# Patient Record
Sex: MALE | Race: ASIAN | NOT HISPANIC OR LATINO
[De-identification: names, ages, dates, MRNs, and addresses within clinical notes are randomized per-mention and may not be internally consistent; named-entity substitution may affect disease eponyms.]

---

## 2020-10-13 ENCOUNTER — APPOINTMENT (OUTPATIENT)
Dept: CARDIOLOGY | Facility: CLINIC | Age: 35
End: 2020-10-13
Payer: MEDICAID

## 2020-10-13 VITALS
BODY MASS INDEX: 29.78 KG/M2 | TEMPERATURE: 97.9 F | RESPIRATION RATE: 17 BRPM | DIASTOLIC BLOOD PRESSURE: 97 MMHG | HEIGHT: 67.5 IN | HEART RATE: 84 BPM | SYSTOLIC BLOOD PRESSURE: 160 MMHG | OXYGEN SATURATION: 95 % | WEIGHT: 192 LBS

## 2020-10-13 DIAGNOSIS — Z87.442 PERSONAL HISTORY OF URINARY CALCULI: ICD-10-CM

## 2020-10-13 DIAGNOSIS — F17.210 NICOTINE DEPENDENCE, CIGARETTES, UNCOMPLICATED: ICD-10-CM

## 2020-10-13 DIAGNOSIS — I10 ESSENTIAL (PRIMARY) HYPERTENSION: ICD-10-CM

## 2020-10-13 DIAGNOSIS — E78.5 HYPERLIPIDEMIA, UNSPECIFIED: ICD-10-CM

## 2020-10-13 DIAGNOSIS — R55 SYNCOPE AND COLLAPSE: ICD-10-CM

## 2020-10-13 DIAGNOSIS — R73.9 HYPERGLYCEMIA, UNSPECIFIED: ICD-10-CM

## 2020-10-13 DIAGNOSIS — R00.2 PALPITATIONS: ICD-10-CM

## 2020-10-13 PROBLEM — Z00.00 ENCOUNTER FOR PREVENTIVE HEALTH EXAMINATION: Status: ACTIVE | Noted: 2020-10-13

## 2020-10-13 PROCEDURE — 99204 OFFICE O/P NEW MOD 45 MIN: CPT | Mod: 25

## 2020-10-13 PROCEDURE — 93015 CV STRESS TEST SUPVJ I&R: CPT

## 2020-10-13 PROCEDURE — 93306 TTE W/DOPPLER COMPLETE: CPT

## 2020-10-14 PROBLEM — F17.210 CIGARETTE SMOKER: Status: ACTIVE | Noted: 2020-10-14

## 2020-10-14 PROBLEM — R73.9 HYPERGLYCEMIA: Status: ACTIVE | Noted: 2020-10-14

## 2020-10-14 PROBLEM — Z87.442 HISTORY OF KIDNEY STONES: Status: RESOLVED | Noted: 2020-10-14 | Resolved: 2020-10-14

## 2020-10-14 PROBLEM — E78.5 HYPERLIPIDEMIA: Status: ACTIVE | Noted: 2020-10-14

## 2020-10-14 RX ORDER — FENOFIBRATE 134 MG/1
134 CAPSULE ORAL
Refills: 0 | Status: ACTIVE | COMMUNITY

## 2020-10-14 RX ORDER — AMLODIPINE BESYLATE 10 MG/1
10 TABLET ORAL
Refills: 0 | Status: ACTIVE | COMMUNITY

## 2020-10-14 RX ORDER — LOSARTAN POTASSIUM 50 MG/1
50 TABLET, FILM COATED ORAL
Refills: 0 | Status: ACTIVE | COMMUNITY

## 2020-10-14 RX ORDER — ATORVASTATIN CALCIUM 20 MG/1
20 TABLET, FILM COATED ORAL
Refills: 0 | Status: ACTIVE | COMMUNITY

## 2020-10-16 NOTE — PHYSICAL EXAM
[General Appearance - Well Developed] : well developed [General Appearance - Well Nourished] : well nourished [Well Groomed] : well groomed [Normal Appearance] : normal appearance [General Appearance - In No Acute Distress] : no acute distress [No Deformities] : no deformities [Normal Conjunctiva] : the conjunctiva exhibited no abnormalities [No Oral Pallor] : no oral pallor [Eyelids - No Xanthelasma] : the eyelids demonstrated no xanthelasmas [Normal Oral Mucosa] : normal oral mucosa [No Oral Cyanosis] : no oral cyanosis [Normal Jugular Venous A Waves Present] : normal jugular venous A waves present [Normal Jugular Venous V Waves Present] : normal jugular venous V waves present [No Jugular Venous Ron A Waves] : no jugular venous ron A waves [Heart Rate And Rhythm] : heart rate and rhythm were normal [Murmurs] : no murmurs present [Heart Sounds] : normal S1 and S2 [Exaggerated Use Of Accessory Muscles For Inspiration] : no accessory muscle use [Respiration, Rhythm And Depth] : normal respiratory rhythm and effort [Abdomen Soft] : soft [Abdomen Tenderness] : non-tender [Auscultation Breath Sounds / Voice Sounds] : lungs were clear to auscultation bilaterally [Abdomen Mass (___ Cm)] : no abdominal mass palpated [Gait - Sufficient For Exercise Testing] : the gait was sufficient for exercise testing [Nail Clubbing] : no clubbing of the fingernails [Abnormal Walk] : normal gait [Petechial Hemorrhages (___cm)] : no petechial hemorrhages [Cyanosis, Localized] : no localized cyanosis [Oriented To Time, Place, And Person] : oriented to person, place, and time [] : no ischemic changes [Mood] : the mood was normal [Affect] : the affect was normal [No Anxiety] : not feeling anxious

## 2020-10-20 PROBLEM — R00.2 PALPITATIONS: Status: ACTIVE | Noted: 2020-10-13

## 2020-10-20 PROBLEM — R55 SYNCOPE: Status: ACTIVE | Noted: 2020-10-13

## 2020-10-20 PROBLEM — I10 HYPERTENSION: Status: ACTIVE | Noted: 2020-10-14

## 2020-10-23 ENCOUNTER — NON-APPOINTMENT (OUTPATIENT)
Age: 35
End: 2020-10-23

## 2020-10-26 NOTE — DISCUSSION/SUMMARY
[FreeTextEntry1] : 35 year old male with HTN, HLD, hyperglycemia, kidney stone, and left ocular prosthesis presents for evaluation of syncope.  Patient reports that in September when he was having dinner with friends and had 2 beers which was not much for him, he started to have palpitations, which lasted about 10 minutes. He felt unstable gait, and diaphoretic. He felt better after he sat down, but collapsed when he stood up. He passed out for about a few seconds.  Patient was conscious when he woke up and did not lose urine or stool control.  He has had 3 episodes of palpitations. Once when he was in high school, and 2 times in September.  Patient denies CP/SOB at baseline.  He is in process of applying to [employer].  The status is still pending due to health examination.  Patient reports having recent knee injury after excessive running for training.  He is on Amlodipine 10 mg and Losartan 50 mg for HTN.  I advised patient to undergo an echocardiogram and a treadmill stress test. I advised patient to wear a Holter monitor. \par \par (1) Syncope, palpitations - Patient was not orthostatic (->135->130).  Patient underwent an echocardiogram and it showed normal LV function without significant valvular pathology. Patient underwent a treadmill stress test and completed 9.5 minutes of Eddy protocol.  There were upsloping ST depressions on ECG but no symptoms.  Following treadmill stress, there was no echocardiographic evidence of ischemia.  However, patient developed NSVT at peak exercise.  Patient wore a Holter and it showed rare APC's, episodes of second degree AV block at night.  I advised patient to followup with EP.\par \par (2) HTN - His BP was elevated initially but was acceptable on repeat measurement.  I advised patient to continue Amlodipine 10 mg and Losartan 50 mg.\par \par (3) Followup - pending EP evaluation.

## 2020-10-26 NOTE — HISTORY OF PRESENT ILLNESS
[FreeTextEntry1] : 35 year old male with HTN, HLD, hyperglycemia, kidney stone, and left ocular prosthesis presents for evaluation of syncope.  Patient reports that in September when he was having dinner with friends and had 2 beers which was not much for him, he started to have palpitations, which lasted about 10 minutes. He felt unstable gait, and diaphoretic. He felt better after he sat down, but collapsed when he stood up. He passed out for about a few seconds.  Patient was conscious when he woke up and did not lose urine or stool control.  He has had 3 episodes of palpitations. Once when he was in high school, and 2 times in September.  Patient denies CP/SOB at baseline.  He is in process of applying to [employer].  The status is still pending due to health examination.  Patient reports having recent knee injury after excessive running for training.  He is on Amlodipine 10 mg and Losartan 50 mg for HTN.  I advised patient to undergo an echocardiogram and a treadmill stress test. I advised patient to wear a Holter monitor.

## 2021-07-12 ENCOUNTER — EMERGENCY (EMERGENCY)
Facility: HOSPITAL | Age: 36
LOS: 1 days | Discharge: HOME | End: 2021-07-12
Attending: CLINIC/CENTER
Payer: MEDICAID

## 2021-07-12 VITALS
TEMPERATURE: 99 F | HEART RATE: 94 BPM | RESPIRATION RATE: 18 BRPM | HEIGHT: 69 IN | SYSTOLIC BLOOD PRESSURE: 123 MMHG | OXYGEN SATURATION: 96 % | DIASTOLIC BLOOD PRESSURE: 84 MMHG | WEIGHT: 199.96 LBS

## 2021-07-12 PROCEDURE — 99285 EMERGENCY DEPT VISIT HI MDM: CPT | Mod: 25

## 2021-07-12 PROCEDURE — 10061 I&D ABSCESS COMP/MULTIPLE: CPT

## 2021-07-12 NOTE — ED ADULT TRIAGE NOTE - CHIEF COMPLAINT QUOTE
L groin pain x several months, worsened yesterday. Hematuria last week. Denies trauma/injury. Denies penile discharge.

## 2021-07-13 VITALS
HEART RATE: 75 BPM | RESPIRATION RATE: 20 BRPM | SYSTOLIC BLOOD PRESSURE: 137 MMHG | TEMPERATURE: 98 F | DIASTOLIC BLOOD PRESSURE: 76 MMHG | OXYGEN SATURATION: 97 %

## 2021-07-13 LAB
ANION GAP SERPL CALC-SCNC: 14 MMOL/L — SIGNIFICANT CHANGE UP (ref 5–17)
BASOPHILS # BLD AUTO: 0.05 K/UL — SIGNIFICANT CHANGE UP (ref 0–0.2)
BASOPHILS NFR BLD AUTO: 0.5 % — SIGNIFICANT CHANGE UP (ref 0–2)
BUN SERPL-MCNC: 14 MG/DL — SIGNIFICANT CHANGE UP (ref 7–23)
CALCIUM SERPL-MCNC: 9.2 MG/DL — SIGNIFICANT CHANGE UP (ref 8.4–10.5)
CHLORIDE SERPL-SCNC: 103 MMOL/L — SIGNIFICANT CHANGE UP (ref 96–108)
CO2 SERPL-SCNC: 22 MMOL/L — SIGNIFICANT CHANGE UP (ref 22–31)
CREAT SERPL-MCNC: 0.87 MG/DL — SIGNIFICANT CHANGE UP (ref 0.5–1.3)
EOSINOPHIL # BLD AUTO: 0.22 K/UL — SIGNIFICANT CHANGE UP (ref 0–0.5)
EOSINOPHIL NFR BLD AUTO: 2.2 % — SIGNIFICANT CHANGE UP (ref 0–6)
GLUCOSE SERPL-MCNC: 104 MG/DL — HIGH (ref 70–99)
HCT VFR BLD CALC: 44.9 % — SIGNIFICANT CHANGE UP (ref 39–50)
HGB BLD-MCNC: 14.2 G/DL — SIGNIFICANT CHANGE UP (ref 13–17)
IMM GRANULOCYTES NFR BLD AUTO: 0.3 % — SIGNIFICANT CHANGE UP (ref 0–1.5)
LYMPHOCYTES # BLD AUTO: 3.66 K/UL — HIGH (ref 1–3.3)
LYMPHOCYTES # BLD AUTO: 36 % — SIGNIFICANT CHANGE UP (ref 13–44)
MCHC RBC-ENTMCNC: 28.1 PG — SIGNIFICANT CHANGE UP (ref 27–34)
MCHC RBC-ENTMCNC: 31.6 GM/DL — LOW (ref 32–36)
MCV RBC AUTO: 88.7 FL — SIGNIFICANT CHANGE UP (ref 80–100)
MONOCYTES # BLD AUTO: 0.7 K/UL — SIGNIFICANT CHANGE UP (ref 0–0.9)
MONOCYTES NFR BLD AUTO: 6.9 % — SIGNIFICANT CHANGE UP (ref 2–14)
NEUTROPHILS # BLD AUTO: 5.52 K/UL — SIGNIFICANT CHANGE UP (ref 1.8–7.4)
NEUTROPHILS NFR BLD AUTO: 54.1 % — SIGNIFICANT CHANGE UP (ref 43–77)
NRBC # BLD: 0 /100 WBCS — SIGNIFICANT CHANGE UP (ref 0–0)
PLATELET # BLD AUTO: 278 K/UL — SIGNIFICANT CHANGE UP (ref 150–400)
POTASSIUM SERPL-MCNC: 3.6 MMOL/L — SIGNIFICANT CHANGE UP (ref 3.5–5.3)
POTASSIUM SERPL-SCNC: 3.6 MMOL/L — SIGNIFICANT CHANGE UP (ref 3.5–5.3)
RBC # BLD: 5.06 M/UL — SIGNIFICANT CHANGE UP (ref 4.2–5.8)
RBC # FLD: 12.3 % — SIGNIFICANT CHANGE UP (ref 10.3–14.5)
SODIUM SERPL-SCNC: 139 MMOL/L — SIGNIFICANT CHANGE UP (ref 135–145)
WBC # BLD: 10.18 K/UL — SIGNIFICANT CHANGE UP (ref 3.8–10.5)
WBC # FLD AUTO: 10.18 K/UL — SIGNIFICANT CHANGE UP (ref 3.8–10.5)

## 2021-07-13 PROCEDURE — 10060 I&D ABSCESS SIMPLE/SINGLE: CPT

## 2021-07-13 PROCEDURE — 85025 COMPLETE CBC W/AUTO DIFF WBC: CPT

## 2021-07-13 PROCEDURE — 80048 BASIC METABOLIC PNL TOTAL CA: CPT

## 2021-07-13 PROCEDURE — 99283 EMERGENCY DEPT VISIT LOW MDM: CPT | Mod: 25

## 2021-07-13 RX ORDER — ACETAMINOPHEN 500 MG
975 TABLET ORAL ONCE
Refills: 0 | Status: COMPLETED | OUTPATIENT
Start: 2021-07-13 | End: 2021-07-13

## 2021-07-13 RX ADMIN — Medication 975 MILLIGRAM(S): at 05:24

## 2021-07-13 NOTE — ED PROCEDURE NOTE - CPROC ED POST PROC CARE GUIDE1
Verbal/written post procedure instructions were given to patient/caregiver.
Verbal/written post procedure instructions were given to patient/caregiver./Instructed patient/caregiver to follow-up with primary care physician./Instructed patient/caregiver regarding signs and symptoms of infection.

## 2021-07-13 NOTE — ED PROVIDER NOTE - PATIENT PORTAL LINK FT
You can access the FollowMyHealth Patient Portal offered by [facility] by registering at the following website: [email or web address]. By joining [employer] [facility]’s FollowMyHealth portal, you will also be able to view your health information using other applications (apps) compatible with our system.

## 2021-07-13 NOTE — ED PROVIDER NOTE - NSFOLLOWUPINSTRUCTIONS_ED_ALL_ED_FT
YOU WERE SEEN IN THE ED FOR: GROIN FOLLICULITIS WITH ABSCESS     YOU HAD INCISION AND DRAINAGE OF THE ABSCESS. YOU CAN SHOWER, BUT PLEASE KEEP THE AREA DRY AND CLEAN.  FOR PAIN/FEVER, YOU MAY TAKE TYLENOL (acetaminophen) AND/OR IBUPROFEN (advil or motrin). FOLLOW THE INSTRUCTIONS ON THE LABEL/CONTAINER.    PLEASE FOLLOW UP WITH YOUR PRIVATE PHYSICIAN WITHIN THE NEXT 72 HOURS. BRING COPIES OF YOUR RESULTS.  -If you do not have a PMD, please call [phone] to find one convenient for you or call our clinic at [phone].    RETURN TO THE EMERGENCY DEPARTMENT IF YOU EXPERIENCE ANY NEW/CONCERNING/WORSENING SYMPTOMS SUCH AS BUT NOT LIMITED TO:  FEVERS, WORSENING DRAINAGE AND/OR PAIN, INCREASING AREA OF REDNESS

## 2021-07-13 NOTE — ED ADULT NURSE NOTE - OBJECTIVE STATEMENT
36 year old male presents to ED via walk-in complaining of groin pain x1 day. Pt with known kidney stone and HTN. Upon assessment A&O x4, ambulatory and well appearing. Pain 4/10, has not taken anything for pain. Quarter sized redness area noted to left side of groin. Denies problems urinating. Endorses blood in urine "on and off" due to kidney stone. bed locked and lowered. VS as documented. Comfort and safety measures maintained.

## 2021-07-13 NOTE — ED PROVIDER NOTE - CLINICAL SUMMARY MEDICAL DECISION MAKING FREE TEXT BOX
36M w/ hx of HTN, presenting with L groin pain x1 day, found to have ~2cm tender erythematous induration in left inguinal area above genitals. Suspect folliculitis. POCUS to r/o abscess. Labs, possible I&D, pain control, reassess. Dispo likely home.

## 2021-07-13 NOTE — ED PROCEDURE NOTE - ATTENDING CONTRIBUTION TO CARE
Attending Statement: I have personally seen and examined this patient.  I have fully participated in the care of this patient. I have reviewed all pertinent clinical information, including history, physical exam, plan and the resident/fellow/apc’s note and agree except as noted.

## 2021-07-13 NOTE — ED PROVIDER NOTE - ATTENDING CONTRIBUTION TO CARE
Attending Statement: I have personally seen and examined this patient.  I have fully participated in the care of this patient. I have reviewed all pertinent clinical information, including history, physical exam, plan and the resident/fellow/apc’s note and agree except as noted.     h.o HTN, p.w acute onset of left groin pain w. focal pain nodule. patient has similar nodule in the past but never tender, reports shaving groin area. PE concerning for abscess vs folliculitis. TTP, 2cm, will POCUS and i&D prn. will symptomatic/pain control at this time. no fever, vital signs wnl, will check FS for DM. no sign of abhinav's or sepsis or testicular torsion or infection.

## 2021-07-13 NOTE — ED PROVIDER NOTE - PHYSICAL EXAMINATION
GENERAL: NAD, well-developed  HEAD:  Atraumatic, Normocephalic  EYES: EOMI, PERRL, sclera clear  NECK: Supple, No JVD  CHEST/LUNG: Clear to auscultation bilaterally; No wheezes or rales  HEART: Regular rate and rhythm; No murmurs, rubs, or gallops  ABDOMEN: Soft, Nontender, Nondistended; Bowel sounds present  : (chaperoned by ED juan Trivedi) ~2cm induration, erythema, TTP on L upper inguinal region above genitals  EXTREMITIES:  2+ Peripheral Pulses, No clubbing, cyanosis, or edema  PSYCH: AAOx3  NEUROLOGY: non-focal  SKIN: No rashes or lesions GENERAL: NAD, well-developed  HEAD:  Atraumatic, Normocephalic  EYES: EOMI, PERRL, sclera clear  NECK: Supple, No JVD  CHEST/LUNG: Clear to auscultation bilaterally; No wheezes or rales  HEART: Regular rate and rhythm; No murmurs, rubs, or gallops  ABDOMEN: Soft, Nontender, Nondistended; Bowel sounds present  : (chaperoned by ED juan Trivedi) ~2cm induration, erythema, TTP on L upper inguinal region above genitals; no testicular tenderness b/l  EXTREMITIES:  2+ Peripheral Pulses, No clubbing, cyanosis, or edema  PSYCH: AAOx3  NEUROLOGY: non-focal  SKIN: No rashes or lesions

## 2021-07-13 NOTE — ED PROVIDER NOTE - OBJECTIVE STATEMENT
36M w/ hx of HTN, presenting with L groin pain x1 day. Pt reports having a small bump/induration in the left groin area above the genitals for the past 6 months. Notes that there was no pain in the past, but he suddenly felt pain there yesterday and noticed that the bump grew larger. Denies f/c/n/v, CP, SOB, abdominal pain, dysuria, diarrhea, penile discharge. Denies being sexually active in the past few months, but prior has only been sexually active with his wife in the past year.

## 2021-07-13 NOTE — ED PROCEDURE NOTE - CPROC ED TIME OUT STATEMENT1
“Patient's name, , procedure and correct site were confirmed during the [facility] [place] Timeout.”
“Patient's name, , procedure and correct site were confirmed during the [place] Timeout.”

## 2021-12-20 ENCOUNTER — RESULT REVIEW (OUTPATIENT)
Age: 36
End: 2021-12-20